# Patient Record
Sex: MALE | Race: WHITE
[De-identification: names, ages, dates, MRNs, and addresses within clinical notes are randomized per-mention and may not be internally consistent; named-entity substitution may affect disease eponyms.]

---

## 2023-04-15 ENCOUNTER — HOSPITAL ENCOUNTER (INPATIENT)
Dept: HOSPITAL 12 - ER | Age: 68
LOS: 2 days | Discharge: ANOTHER HOSPITAL | DRG: 871 | End: 2023-04-17
Attending: INTERNAL MEDICINE | Admitting: INTERNAL MEDICINE
Payer: COMMERCIAL

## 2023-04-15 VITALS — SYSTOLIC BLOOD PRESSURE: 132 MMHG | DIASTOLIC BLOOD PRESSURE: 89 MMHG

## 2023-04-15 VITALS — HEIGHT: 67 IN | BODY MASS INDEX: 39.24 KG/M2 | WEIGHT: 250 LBS

## 2023-04-15 VITALS — SYSTOLIC BLOOD PRESSURE: 108 MMHG | DIASTOLIC BLOOD PRESSURE: 88 MMHG

## 2023-04-15 VITALS — DIASTOLIC BLOOD PRESSURE: 54 MMHG | SYSTOLIC BLOOD PRESSURE: 92 MMHG

## 2023-04-15 VITALS — DIASTOLIC BLOOD PRESSURE: 91 MMHG | SYSTOLIC BLOOD PRESSURE: 105 MMHG

## 2023-04-15 VITALS — SYSTOLIC BLOOD PRESSURE: 115 MMHG | DIASTOLIC BLOOD PRESSURE: 74 MMHG

## 2023-04-15 DIAGNOSIS — M21.41: ICD-10-CM

## 2023-04-15 DIAGNOSIS — Z20.822: ICD-10-CM

## 2023-04-15 DIAGNOSIS — Z79.4: ICD-10-CM

## 2023-04-15 DIAGNOSIS — I25.10: ICD-10-CM

## 2023-04-15 DIAGNOSIS — Z95.1: ICD-10-CM

## 2023-04-15 DIAGNOSIS — L02.611: ICD-10-CM

## 2023-04-15 DIAGNOSIS — I48.91: ICD-10-CM

## 2023-04-15 DIAGNOSIS — I77.1: ICD-10-CM

## 2023-04-15 DIAGNOSIS — E10.51: ICD-10-CM

## 2023-04-15 DIAGNOSIS — I21.A1: ICD-10-CM

## 2023-04-15 DIAGNOSIS — M50.31: ICD-10-CM

## 2023-04-15 DIAGNOSIS — E66.9: ICD-10-CM

## 2023-04-15 DIAGNOSIS — F19.10: ICD-10-CM

## 2023-04-15 DIAGNOSIS — A41.9: Primary | ICD-10-CM

## 2023-04-15 DIAGNOSIS — I48.92: ICD-10-CM

## 2023-04-15 DIAGNOSIS — I70.202: ICD-10-CM

## 2023-04-15 DIAGNOSIS — D68.59: ICD-10-CM

## 2023-04-15 DIAGNOSIS — M86.8X7: ICD-10-CM

## 2023-04-15 DIAGNOSIS — L97.519: ICD-10-CM

## 2023-04-15 DIAGNOSIS — N17.0: ICD-10-CM

## 2023-04-15 DIAGNOSIS — E87.1: ICD-10-CM

## 2023-04-15 DIAGNOSIS — I50.23: ICD-10-CM

## 2023-04-15 DIAGNOSIS — E10.42: ICD-10-CM

## 2023-04-15 DIAGNOSIS — L03.115: ICD-10-CM

## 2023-04-15 DIAGNOSIS — E10.69: ICD-10-CM

## 2023-04-15 DIAGNOSIS — E10.610: ICD-10-CM

## 2023-04-15 DIAGNOSIS — E10.10: ICD-10-CM

## 2023-04-15 DIAGNOSIS — E87.5: ICD-10-CM

## 2023-04-15 DIAGNOSIS — M48.02: ICD-10-CM

## 2023-04-15 DIAGNOSIS — E10.621: ICD-10-CM

## 2023-04-15 DIAGNOSIS — Z74.09: ICD-10-CM

## 2023-04-15 DIAGNOSIS — G92.8: ICD-10-CM

## 2023-04-15 DIAGNOSIS — Z91.81: ICD-10-CM

## 2023-04-15 DIAGNOSIS — I70.235: ICD-10-CM

## 2023-04-15 LAB
ALP SERPL-CCNC: 79 U/L (ref 50–136)
ALT SERPL W/O P-5'-P-CCNC: 20 U/L (ref 16–63)
AST SERPL-CCNC: 9 U/L (ref 15–37)
BILIRUB DIRECT SERPL-MCNC: 0.3 MG/DL (ref 0–0.2)
BILIRUB SERPL-MCNC: 0.6 MG/DL (ref 0.2–1)
BUN SERPL-MCNC: 78 MG/DL (ref 7–18)
BUN SERPL-MCNC: 89 MG/DL (ref 7–18)
CHLORIDE SERPL-SCNC: 84 MMOL/L (ref 98–107)
CHLORIDE SERPL-SCNC: 91 MMOL/L (ref 98–107)
CO2 SERPL-SCNC: 12 MMOL/L (ref 21–32)
CO2 SERPL-SCNC: 15 MMOL/L (ref 21–32)
CREAT SERPL-MCNC: 1.9 MG/DL (ref 0.6–1.3)
CREAT SERPL-MCNC: 2.4 MG/DL (ref 0.6–1.3)
GLUCOSE SERPL-MCNC: 587 MG/DL (ref 74–106)
GLUCOSE SERPL-MCNC: 742 MG/DL (ref 74–106)
HCT VFR BLD AUTO: 44.8 % (ref 36.7–47.1)
MAGNESIUM SERPL-MCNC: 2.7 MG/DL (ref 1.8–2.4)
MCH RBC QN AUTO: 28.3 UUG (ref 23.8–33.4)
MCV RBC AUTO: 90.6 FL (ref 73–96.2)
PHOSPHATE SERPL-MCNC: 3.7 MG/DL (ref 2.5–4.9)
PLATELET # BLD AUTO: 284 K/UL (ref 152–348)
POTASSIUM SERPL-SCNC: 4.5 MMOL/L (ref 3.5–5.1)
POTASSIUM SERPL-SCNC: 5.4 MMOL/L (ref 3.5–5.1)
WS STN SPEC: 7.2 G/DL (ref 6.4–8.2)

## 2023-04-15 PROCEDURE — G0378 HOSPITAL OBSERVATION PER HR: HCPCS

## 2023-04-15 PROCEDURE — C9113 INJ PANTOPRAZOLE SODIUM, VIA: HCPCS

## 2023-04-15 PROCEDURE — A4663 DIALYSIS BLOOD PRESSURE CUFF: HCPCS

## 2023-04-15 RX ADMIN — PIPERACILLIN SODIUM AND TAZOBACTAM SODIUM SCH MLS/HR: .25; 2 INJECTION, POWDER, LYOPHILIZED, FOR SOLUTION INTRAVENOUS at 23:50

## 2023-04-15 RX ADMIN — HUMAN INSULIN PRN MLS/HR: 100 INJECTION, SOLUTION SUBCUTANEOUS at 20:00

## 2023-04-15 RX ADMIN — SODIUM CHLORIDE PRN MLS/HR: 0.9 INJECTION, SOLUTION INTRAVENOUS at 20:45

## 2023-04-15 RX ADMIN — SODIUM CHLORIDE PRN MLS/HR: 0.9 INJECTION, SOLUTION INTRAVENOUS at 18:09

## 2023-04-15 RX ADMIN — HEPARIN SODIUM AND DEXTROSE PRN MLS/HR: 5000; 5 INJECTION INTRAVENOUS at 23:00

## 2023-04-15 RX ADMIN — SODIUM CHLORIDE PRN MLS/HR: 0.9 INJECTION, SOLUTION INTRAVENOUS at 18:23

## 2023-04-15 RX ADMIN — SODIUM CHLORIDE PRN MLS/HR: 0.9 INJECTION, SOLUTION INTRAVENOUS at 18:17

## 2023-04-15 NOTE — NUR
Called pharmacy for status of cardizem. Pharmacist stated that medication is 
not ready and they are going to prepare it.

## 2023-04-15 NOTE — NUR
Patient is to receive Cardizem drip and insulin drip however patient only has 
one line due to poor peripheral access. MD aware. Patient is to receive a 
midline per MD order, however, I was notified by nursing supervisor that the 
midline nurse would come at 7-8pm. MD Dr Ceja stated to start cardizem drip 
and to administer the insulin drip once second IV access is established.

## 2023-04-15 NOTE — NUR
Patient is alert, awake, oriented to name, but unable to recall the exact names 
& dosages of his home medicines at this time.

## 2023-04-16 VITALS — DIASTOLIC BLOOD PRESSURE: 54 MMHG | SYSTOLIC BLOOD PRESSURE: 142 MMHG

## 2023-04-16 VITALS — DIASTOLIC BLOOD PRESSURE: 79 MMHG | SYSTOLIC BLOOD PRESSURE: 93 MMHG

## 2023-04-16 VITALS — DIASTOLIC BLOOD PRESSURE: 64 MMHG | SYSTOLIC BLOOD PRESSURE: 114 MMHG

## 2023-04-16 VITALS — SYSTOLIC BLOOD PRESSURE: 106 MMHG | DIASTOLIC BLOOD PRESSURE: 64 MMHG

## 2023-04-16 VITALS — SYSTOLIC BLOOD PRESSURE: 112 MMHG | DIASTOLIC BLOOD PRESSURE: 66 MMHG

## 2023-04-16 VITALS — DIASTOLIC BLOOD PRESSURE: 74 MMHG | SYSTOLIC BLOOD PRESSURE: 111 MMHG

## 2023-04-16 VITALS — SYSTOLIC BLOOD PRESSURE: 120 MMHG | DIASTOLIC BLOOD PRESSURE: 78 MMHG

## 2023-04-16 VITALS — SYSTOLIC BLOOD PRESSURE: 124 MMHG | DIASTOLIC BLOOD PRESSURE: 74 MMHG

## 2023-04-16 VITALS — SYSTOLIC BLOOD PRESSURE: 105 MMHG | DIASTOLIC BLOOD PRESSURE: 70 MMHG

## 2023-04-16 VITALS — DIASTOLIC BLOOD PRESSURE: 51 MMHG | SYSTOLIC BLOOD PRESSURE: 106 MMHG

## 2023-04-16 VITALS — DIASTOLIC BLOOD PRESSURE: 55 MMHG | SYSTOLIC BLOOD PRESSURE: 114 MMHG

## 2023-04-16 VITALS — SYSTOLIC BLOOD PRESSURE: 140 MMHG | DIASTOLIC BLOOD PRESSURE: 70 MMHG

## 2023-04-16 VITALS — SYSTOLIC BLOOD PRESSURE: 150 MMHG | DIASTOLIC BLOOD PRESSURE: 64 MMHG

## 2023-04-16 VITALS — DIASTOLIC BLOOD PRESSURE: 62 MMHG | SYSTOLIC BLOOD PRESSURE: 128 MMHG

## 2023-04-16 VITALS — SYSTOLIC BLOOD PRESSURE: 115 MMHG | DIASTOLIC BLOOD PRESSURE: 76 MMHG

## 2023-04-16 VITALS — DIASTOLIC BLOOD PRESSURE: 70 MMHG | SYSTOLIC BLOOD PRESSURE: 117 MMHG

## 2023-04-16 VITALS — DIASTOLIC BLOOD PRESSURE: 60 MMHG | SYSTOLIC BLOOD PRESSURE: 127 MMHG

## 2023-04-16 VITALS — DIASTOLIC BLOOD PRESSURE: 69 MMHG | SYSTOLIC BLOOD PRESSURE: 110 MMHG

## 2023-04-16 VITALS — DIASTOLIC BLOOD PRESSURE: 90 MMHG | SYSTOLIC BLOOD PRESSURE: 118 MMHG

## 2023-04-16 VITALS — DIASTOLIC BLOOD PRESSURE: 71 MMHG | SYSTOLIC BLOOD PRESSURE: 93 MMHG

## 2023-04-16 VITALS — DIASTOLIC BLOOD PRESSURE: 81 MMHG | SYSTOLIC BLOOD PRESSURE: 114 MMHG

## 2023-04-16 VITALS — SYSTOLIC BLOOD PRESSURE: 119 MMHG | DIASTOLIC BLOOD PRESSURE: 88 MMHG

## 2023-04-16 VITALS — SYSTOLIC BLOOD PRESSURE: 119 MMHG | DIASTOLIC BLOOD PRESSURE: 73 MMHG

## 2023-04-16 VITALS — SYSTOLIC BLOOD PRESSURE: 125 MMHG | DIASTOLIC BLOOD PRESSURE: 69 MMHG

## 2023-04-16 LAB
ALP SERPL-CCNC: 58 U/L (ref 50–136)
ALT SERPL W/O P-5'-P-CCNC: 21 U/L (ref 16–63)
AMORPH URATE CRY URNS QL MICRO: (no result) /HPF
APPEARANCE UR: CLEAR
AST SERPL-CCNC: 24 U/L (ref 15–37)
BASE EXCESS BLDA CALC-SCNC: -5.5 MMOL/L
BILIRUB SERPL-MCNC: 0.4 MG/DL (ref 0.2–1)
BILIRUB UR QL STRIP: NEGATIVE
BUN SERPL-MCNC: 41 MG/DL (ref 7–18)
BUN SERPL-MCNC: 46 MG/DL (ref 7–18)
BUN SERPL-MCNC: 61 MG/DL (ref 7–18)
BUN SERPL-MCNC: 63 MG/DL (ref 7–18)
CHLORIDE SERPL-SCNC: 103 MMOL/L (ref 98–107)
CHLORIDE SERPL-SCNC: 105 MMOL/L (ref 98–107)
CHLORIDE SERPL-SCNC: 98 MMOL/L (ref 98–107)
CHLORIDE SERPL-SCNC: 98 MMOL/L (ref 98–107)
CHOLEST SERPL-MCNC: 109 MG/DL (ref ?–200)
CO2 SERPL-SCNC: 21 MMOL/L (ref 21–32)
CO2 SERPL-SCNC: 22 MMOL/L (ref 21–32)
CO2 SERPL-SCNC: 24 MMOL/L (ref 21–32)
CO2 SERPL-SCNC: 25 MMOL/L (ref 21–32)
COLOR UR: YELLOW
CREAT SERPL-MCNC: 1.2 MG/DL (ref 0.6–1.3)
CREAT SERPL-MCNC: 1.4 MG/DL (ref 0.6–1.3)
CREAT SERPL-MCNC: 1.6 MG/DL (ref 0.6–1.3)
CREAT SERPL-MCNC: 1.6 MG/DL (ref 0.6–1.3)
DEPRECATED SQUAMOUS URNS QL MICRO: (no result) /HPF
GLUCOSE SERPL-MCNC: 255 MG/DL (ref 74–106)
GLUCOSE SERPL-MCNC: 280 MG/DL (ref 74–106)
GLUCOSE SERPL-MCNC: 363 MG/DL (ref 74–106)
GLUCOSE SERPL-MCNC: 363 MG/DL (ref 74–106)
GLUCOSE UR STRIP-MCNC: (no result) MG/DL
HCO3 BLDA-SCNC: 18.6 MMOL/L
HCT VFR BLD AUTO: 37 % (ref 36.7–47.1)
HDLC SERPL-MCNC: 16 MG/DL (ref 40–60)
HGB BLDA OXIMETRY-MCNC: 13.1 G/DL (ref 13.5–18)
HGB UR QL STRIP: (no result)
INHALED O2 CONCENTRATION: 21 %
KETONES UR STRIP-MCNC: (no result) MG/DL
LEUKOCYTE ESTERASE UR QL STRIP: NEGATIVE
MAGNESIUM SERPL-MCNC: 2.5 MG/DL (ref 1.8–2.4)
MCH RBC QN AUTO: 28.6 UUG (ref 23.8–33.4)
MCV RBC AUTO: 84.4 FL (ref 73–96.2)
NITRITE UR QL STRIP: NEGATIVE
PCO2 TEMP ADJ BLDA: 32.1 MMHG (ref 35–45)
PH TEMP ADJ BLDA: 7.38 [PH] (ref 7.35–7.45)
PH UR STRIP: 6 [PH] (ref 5–8)
PHOSPHATE SERPL-MCNC: 2.5 MG/DL (ref 2.5–4.9)
PLATELET # BLD AUTO: 236 K/UL (ref 152–348)
PO2 TEMP ADJ BLDA: 81.1 MMHG (ref 75–100)
POTASSIUM SERPL-SCNC: 3.4 MMOL/L (ref 3.5–5.1)
POTASSIUM SERPL-SCNC: 3.4 MMOL/L (ref 3.5–5.1)
POTASSIUM SERPL-SCNC: 3.6 MMOL/L (ref 3.5–5.1)
POTASSIUM SERPL-SCNC: 3.7 MMOL/L (ref 3.5–5.1)
RBC #/AREA URNS HPF: (no result) /HPF (ref 0–3)
SP GR UR STRIP: 1.01 (ref 1–1.03)
SPECIMEN DRAWN FROM PATIENT: (no result)
TRIGL SERPL-MCNC: 138 MG/DL (ref 30–150)
TSH SERPL DL<=0.005 MIU/L-ACNC: 0.72 MIU/ML (ref 0.36–3.74)
URATE CRY URNS QL MICRO: (no result) /HPF
UROBILINOGEN UR STRIP-MCNC: 0.2 E.U./DL
WBC #/AREA URNS HPF: (no result) /HPF
WBC #/AREA URNS HPF: (no result) /HPF (ref 0–3)
WS STN SPEC: 5.9 G/DL (ref 6.4–8.2)

## 2023-04-16 PROCEDURE — 05H333Z INSERTION OF INFUSION DEVICE INTO RIGHT INNOMINATE VEIN, PERCUTANEOUS APPROACH: ICD-10-PCS

## 2023-04-16 RX ADMIN — Medication SCH EACH: at 11:03

## 2023-04-16 RX ADMIN — HEPARIN SODIUM AND DEXTROSE PRN MLS/HR: 5000; 5 INJECTION INTRAVENOUS at 13:13

## 2023-04-16 RX ADMIN — HEPARIN SODIUM AND DEXTROSE PRN MLS/HR: 5000; 5 INJECTION INTRAVENOUS at 09:17

## 2023-04-16 RX ADMIN — SODIUM CHLORIDE PRN MLS/HR: 0.9 INJECTION, SOLUTION INTRAVENOUS at 06:30

## 2023-04-16 RX ADMIN — Medication SCH EACH: at 12:18

## 2023-04-16 RX ADMIN — Medication SCH EACH: at 16:07

## 2023-04-16 RX ADMIN — HUMAN INSULIN PRN MLS/HR: 100 INJECTION, SOLUTION SUBCUTANEOUS at 15:26

## 2023-04-16 RX ADMIN — Medication SCH EACH: at 09:00

## 2023-04-16 RX ADMIN — PIPERACILLIN SODIUM AND TAZOBACTAM SODIUM SCH MLS/HR: .25; 2 INJECTION, POWDER, LYOPHILIZED, FOR SOLUTION INTRAVENOUS at 05:12

## 2023-04-16 RX ADMIN — SODIUM CHLORIDE PRN MLS/HR: 0.9 INJECTION, SOLUTION INTRAVENOUS at 14:55

## 2023-04-16 RX ADMIN — Medication SCH EACH: at 18:49

## 2023-04-16 RX ADMIN — HUMAN INSULIN PRN MLS/HR: 100 INJECTION, SOLUTION SUBCUTANEOUS at 10:14

## 2023-04-16 RX ADMIN — DEXTROSE SCH MG: 50 INJECTION, SOLUTION INTRAVENOUS at 10:20

## 2023-04-16 RX ADMIN — Medication SCH EACH: at 13:59

## 2023-04-16 RX ADMIN — Medication SCH EACH: at 10:22

## 2023-04-16 RX ADMIN — Medication SCH EACH: at 15:12

## 2023-04-16 RX ADMIN — SODIUM CHLORIDE PRN MLS/HR: 0.9 INJECTION, SOLUTION INTRAVENOUS at 10:22

## 2023-04-16 RX ADMIN — Medication SCH EACH: at 17:03

## 2023-04-16 RX ADMIN — SODIUM CHLORIDE PRN MLS/HR: 0.9 INJECTION, SOLUTION INTRAVENOUS at 01:40

## 2023-04-16 RX ADMIN — Medication SCH EACH: at 23:41

## 2023-04-16 RX ADMIN — SODIUM CHLORIDE PRN UNIT: 9 INJECTION, SOLUTION INTRAVENOUS at 23:44

## 2023-04-16 RX ADMIN — PIPERACILLIN SODIUM AND TAZOBACTAM SODIUM SCH MLS/HR: .375; 3 INJECTION, POWDER, LYOPHILIZED, FOR SOLUTION INTRAVENOUS at 11:18

## 2023-04-16 RX ADMIN — PIPERACILLIN SODIUM AND TAZOBACTAM SODIUM SCH MLS/HR: .375; 3 INJECTION, POWDER, LYOPHILIZED, FOR SOLUTION INTRAVENOUS at 17:24

## 2023-04-16 RX ADMIN — HUMAN INSULIN PRN MLS/HR: 100 INJECTION, SOLUTION SUBCUTANEOUS at 13:58

## 2023-04-16 RX ADMIN — Medication SCH EACH: at 20:00

## 2023-04-16 RX ADMIN — HUMAN INSULIN PRN MLS/HR: 100 INJECTION, SOLUTION SUBCUTANEOUS at 16:12

## 2023-04-16 RX ADMIN — PIPERACILLIN SODIUM AND TAZOBACTAM SODIUM SCH MLS/HR: .375; 3 INJECTION, POWDER, LYOPHILIZED, FOR SOLUTION INTRAVENOUS at 23:33

## 2023-04-16 RX ADMIN — HUMAN INSULIN PRN MLS/HR: 100 INJECTION, SOLUTION SUBCUTANEOUS at 11:11

## 2023-04-16 RX ADMIN — SODIUM CHLORIDE PRN UNIT: 9 INJECTION, SOLUTION INTRAVENOUS at 20:09

## 2023-04-16 RX ADMIN — APIXABAN SCH MG: 5 TABLET, FILM COATED ORAL at 15:31

## 2023-04-16 NOTE — NUR
Pharmacist spoke with the ex wife as well to follow up with the list of home medications. 
The ex wife said that she would return later tonight with the list of home medications 
because she hasn't had the time to get the list of home medications that I requested from 
the ex wife and sister (Paula)

## 2023-04-16 NOTE — NUR
Spoke with Patients ex wife and informed her that we need to get a list of the patients home 
medications. Also informed her that despite her claim that paramedics said that they would 
give us a list; they didn't forward a list of hs home medications to ED and patient is unabl 
to remember his home medicatiions

## 2023-04-17 VITALS — DIASTOLIC BLOOD PRESSURE: 35 MMHG | SYSTOLIC BLOOD PRESSURE: 102 MMHG

## 2023-04-17 VITALS — DIASTOLIC BLOOD PRESSURE: 83 MMHG | SYSTOLIC BLOOD PRESSURE: 123 MMHG

## 2023-04-17 VITALS — DIASTOLIC BLOOD PRESSURE: 49 MMHG | SYSTOLIC BLOOD PRESSURE: 128 MMHG

## 2023-04-17 VITALS — SYSTOLIC BLOOD PRESSURE: 128 MMHG | DIASTOLIC BLOOD PRESSURE: 62 MMHG

## 2023-04-17 VITALS — DIASTOLIC BLOOD PRESSURE: 80 MMHG | SYSTOLIC BLOOD PRESSURE: 127 MMHG

## 2023-04-17 VITALS — DIASTOLIC BLOOD PRESSURE: 63 MMHG | SYSTOLIC BLOOD PRESSURE: 132 MMHG

## 2023-04-17 VITALS — SYSTOLIC BLOOD PRESSURE: 118 MMHG | DIASTOLIC BLOOD PRESSURE: 65 MMHG

## 2023-04-17 VITALS — DIASTOLIC BLOOD PRESSURE: 58 MMHG | SYSTOLIC BLOOD PRESSURE: 124 MMHG

## 2023-04-17 VITALS — SYSTOLIC BLOOD PRESSURE: 123 MMHG | DIASTOLIC BLOOD PRESSURE: 65 MMHG

## 2023-04-17 LAB
AMPHETAMINES UR QL SCN>1000 NG/ML: NEGATIVE
BUN SERPL-MCNC: 35 MG/DL (ref 7–18)
CHLORIDE SERPL-SCNC: 103 MMOL/L (ref 98–107)
CO2 SERPL-SCNC: 25 MMOL/L (ref 21–32)
COCAINE UR QL SCN: NEGATIVE
CREAT SERPL-MCNC: 1.2 MG/DL (ref 0.6–1.3)
GLUCOSE SERPL-MCNC: 329 MG/DL (ref 74–106)
HCT VFR BLD AUTO: 35.1 % (ref 36.7–47.1)
MAGNESIUM SERPL-MCNC: 2.5 MG/DL (ref 1.8–2.4)
MCH RBC QN AUTO: 28.5 UUG (ref 23.8–33.4)
MCV RBC AUTO: 84.9 FL (ref 73–96.2)
PCP UR QL SCN>25 NG/ML: NEGATIVE
PHOSPHATE SERPL-MCNC: 1.6 MG/DL (ref 2.5–4.9)
PLATELET # BLD AUTO: 213 K/UL (ref 152–348)
POTASSIUM SERPL-SCNC: 4 MMOL/L (ref 3.5–5.1)
THC UR QL SCN>50 NG/ML: NEGATIVE

## 2023-04-17 RX ADMIN — SODIUM CHLORIDE PRN UNIT: 9 INJECTION, SOLUTION INTRAVENOUS at 12:20

## 2023-04-17 RX ADMIN — Medication SCH EACH: at 04:20

## 2023-04-17 RX ADMIN — SODIUM CHLORIDE PRN UNIT: 9 INJECTION, SOLUTION INTRAVENOUS at 04:18

## 2023-04-17 RX ADMIN — DEXTROSE SCH MG: 50 INJECTION, SOLUTION INTRAVENOUS at 08:47

## 2023-04-17 RX ADMIN — PIPERACILLIN SODIUM AND TAZOBACTAM SODIUM SCH MLS/HR: .375; 3 INJECTION, POWDER, LYOPHILIZED, FOR SOLUTION INTRAVENOUS at 18:22

## 2023-04-17 RX ADMIN — SODIUM CHLORIDE PRN UNIT: 9 INJECTION, SOLUTION INTRAVENOUS at 08:06

## 2023-04-17 RX ADMIN — Medication SCH EACH: at 16:28

## 2023-04-17 RX ADMIN — PIPERACILLIN SODIUM AND TAZOBACTAM SODIUM SCH MLS/HR: .375; 3 INJECTION, POWDER, LYOPHILIZED, FOR SOLUTION INTRAVENOUS at 12:28

## 2023-04-17 RX ADMIN — APIXABAN SCH MG: 5 TABLET, FILM COATED ORAL at 08:44

## 2023-04-17 RX ADMIN — PIPERACILLIN SODIUM AND TAZOBACTAM SODIUM SCH MLS/HR: .375; 3 INJECTION, POWDER, LYOPHILIZED, FOR SOLUTION INTRAVENOUS at 05:32

## 2023-04-17 RX ADMIN — SODIUM CHLORIDE PRN UNIT: 9 INJECTION, SOLUTION INTRAVENOUS at 16:44

## 2023-04-17 RX ADMIN — Medication SCH EACH: at 12:11

## 2023-04-17 RX ADMIN — Medication SCH EACH: at 08:06

## 2023-04-17 NOTE — NUR
Notified Roselyn from [facility] about patient's troponin level. She 
will call back to discuss the plan for patient.

## 2023-04-17 NOTE — NUR
WOUND CARE CONSULT: PT PRESENTS WITH OPEN BLISTER WITH DRAINING WOUND TO RT PLANTAR FOOT, 
PRESENT ON ADMISSION. DR NGUYEN CALLED FOR DPM CONSULT. DISCUSSED SKIN PROTECTION WITH 
NURSING STAFF. MD IN AGREEMENT WITH PLAN OF CARE.

## 2023-04-17 NOTE — NUR
Spoke with [facility] who followed up regarding the patient. 
She stated she will call back if the doctor needs any further information.

## 2023-04-17 NOTE — NUR
Notified by Dr. Schwartz that patient needs to be transferred urgently to [place] 
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

## 2023-04-17 NOTE — NUR
Per [facility] patient is going to 

[facility] Room 5301

Number for report [phone]

Dr Santiago accepting MD

Ambulance is either [facility] or [facility] ETA 1 hour

## 2023-04-17 NOTE — NUR
Notified Dr. Schwartz about patient's restlessness and agitation. Patient yelling and cursing at 
nursing staff. New order received.

## 2023-04-17 NOTE — NUR
Spoke with Roselyn from [facility]. She is requesting covid result and another 
troponin draw. Notified Dr. Schwartz.

## 2023-04-17 NOTE — NUR
Patient wanting to go home. 

Attempting to remove medical devices.  

Uncooperative in cares.  

Thinking staff is against him. 

Notified IDA Bellamy.  

Noted new orders.

## 2023-04-17 NOTE — NUR
Comprehensive Intake Entered On:  5/7/2020 10:56 AM CDT    Performed On:  5/7/2020 10:42 AM CDT by Zenia Dial CMA               Summary   Chief Complaint :   pt here for a cortisone injection in both shoulders.    Weight Measured :   197.0 lb(Converted to: 197 lb 0 oz, 89.36 kg)    Systolic Blood Pressure :   122 mmHg   Diastolic Blood Pressure :   70 mmHg   Mean Arterial Pressure :   87 mmHg   Peripheral Pulse Rate :   90 bpm   BP Site :   Right arm   BP Method :   Manual   HR Method :   Electronic   Temperature Tympanic :   98.4 DegF(Converted to: 36.9 DegC)    Oxygen Saturation :   98 %   Zenia Dial CMA - 5/7/2020 10:42 AM CDT   Health Status   Allergies Verified? :   Yes   Medication History Verified? :   Yes   Pre-Visit Planning Status :   N/A   Tobacco Use? :   Current every day smoker   Zenia Dial CMA - 5/7/2020 10:42 AM CDT   Consents   Consent for Immunization Exchange :   Consent Granted   Consent for Immunizations to Providers :   Consent Granted   Zenia Dial CMA - 5/7/2020 10:42 AM CDT   Meds / Allergies   (As Of: 5/7/2020 10:56:55 AM CDT)   Allergies (Active)   No Known Medication Allergies  Estimated Onset Date:   Unspecified ; Created By:   Laisha Murillo CMA; Reaction Status:   Active ; Category:   Drug ; Substance:   No Known Medication Allergies ; Type:   Allergy ; Updated By:   Laisha Murillo CMA; Reviewed Date:   6/27/2019 9:23 AM CDT        Medication List   (As Of: 5/7/2020 10:56:55 AM CDT)   Prescription/Discharge Order    fluticasone nasal  :   fluticasone nasal ; Status:   Prescribed ; Ordered As Mnemonic:   fluticasone 50 mcg/inh nasal spray ; Simple Display Line:   2 spray(s), nasal, daily, for 90 day(s), 3 EA, 3 Refill(s) ; Ordering Provider:   David Jenkins MD; Catalog Code:   fluticasone nasal ; Order Dt/Tm:   10/18/2018 9:06:03 AM CDT          sildenafil  :   sildenafil ; Status:   Prescribed ; Ordered As Mnemonic:   Viagra 100 mg oral tablet ; Simple Display  Spoke with Roselyn from [facility]. Notified her about the arterial 
occlusion in patient's leg and per Dr. Schwartz, patient urgently needs vascular 
surgery. Faxed over the ultrasound report. Awaiting call back in regards to the 
plan for patient. Line:   See Instructions, TAKE AS DIRECTED, 8 tab(s), 11 Refill(s) ; Ordering Provider:   David Jenkins MD; Catalog Code:   sildenafil ; Order Dt/Tm:   4/20/2016 12:38:56 PM CDT          warfarin  :   warfarin ; Status:   Prescribed ; Ordered As Mnemonic:   warfarin 7.5 mg oral tablet ; Simple Display Line:   0.5 tab(s), Oral, daily, Or as directed by provider, 45 tab(s), 1 Refill(s) ; Ordering Provider:   David Jenkins MD; Catalog Code:   warfarin ; Order Dt/Tm:   7/5/2019 1:46:02 PM CDT            Home Meds    acetaminophen  :   acetaminophen ; Status:   Documented ; Ordered As Mnemonic:   acetaminophen ; Simple Display Line:   See Instructions, 325mg-650 mg PRN for pain/fever., 0 Refill(s) ; Catalog Code:   acetaminophen ; Order Dt/Tm:   6/19/2019 3:01:15 PM CDT          atorvastatin  :   atorvastatin ; Status:   Documented ; Ordered As Mnemonic:   atorvastatin 10 mg oral tablet ; Simple Display Line:   10 mg, 1 tab(s), Oral, daily, 0 Refill(s) ; Catalog Code:   atorvastatin ; Order Dt/Tm:   5/7/2020 10:52:50 AM CDT          fluticasone nasal  :   fluticasone nasal ; Status:   Documented ; Ordered As Mnemonic:   fluticasone 50 mcg/inh nasal spray ; Simple Display Line:   50 mcg, Nasal, daily, PRN for rhinitis., 0 Refill(s) ; Catalog Code:   fluticasone nasal ; Order Dt/Tm:   6/19/2019 3:06:00 PM CDT          gabapentin  :   gabapentin ; Status:   Documented ; Ordered As Mnemonic:   gabapentin 100 mg oral capsule ; Simple Display Line:   100 mg, 1 cap(s), Oral, tid, 0 Refill(s) ; Catalog Code:   gabapentin ; Order Dt/Tm:   6/21/2019 10:40:48 AM CDT            ID Risk Screen   Recent Travel History :   Last travel within 21 days   Recent Travel Location :   [facility]   Family Member Travel History :   Last travel within 21 days   Family Member Recent Travel Location :   [facility]   Other Exposure to Infectious Disease :   Unknown   Zenia Dial CMA - 5/7/2020 10:42 AM CDT

## 2023-04-17 NOTE — NUR
Dr. Chacon at bedside for assessment. Plan to wean patient off cardizem 
drip. MD stated that he may add beta blocker to treatment plan. MD also stated 
that under 110 heart rate is acceptable.

## 2023-04-17 NOTE — NUR
Accepting MD at [facility] is Dr. Santiago. Per Roselyn, [facility], they are 
waiting for an available bed for this patient.